# Patient Record
Sex: FEMALE | Race: OTHER | HISPANIC OR LATINO | URBAN - METROPOLITAN AREA
[De-identification: names, ages, dates, MRNs, and addresses within clinical notes are randomized per-mention and may not be internally consistent; named-entity substitution may affect disease eponyms.]

---

## 2022-01-11 ENCOUNTER — EMERGENCY (EMERGENCY)
Facility: HOSPITAL | Age: 62
LOS: 1 days | Discharge: HOME | End: 2022-01-11
Attending: EMERGENCY MEDICINE
Payer: COMMERCIAL

## 2022-01-11 VITALS
WEIGHT: 175.05 LBS | HEIGHT: 63 IN | SYSTOLIC BLOOD PRESSURE: 134 MMHG | OXYGEN SATURATION: 95 % | RESPIRATION RATE: 16 BRPM | TEMPERATURE: 98 F | HEART RATE: 78 BPM | DIASTOLIC BLOOD PRESSURE: 83 MMHG

## 2022-01-11 LAB
RAPID RVP RESULT: DETECTED
SARS-COV-2 RNA SPEC QL NAA+PROBE: DETECTED

## 2022-01-11 PROCEDURE — 99284 EMERGENCY DEPT VISIT MOD MDM: CPT

## 2022-01-11 PROCEDURE — 0225U NFCT DS DNA&RNA 21 SARSCOV2: CPT

## 2022-01-11 PROCEDURE — 99283 EMERGENCY DEPT VISIT LOW MDM: CPT

## 2022-01-11 RX ORDER — ACETAMINOPHEN 500 MG
650 TABLET ORAL ONCE
Refills: 0 | Status: DISCONTINUED | OUTPATIENT
Start: 2022-01-11 | End: 2022-01-14

## 2022-01-11 NOTE — ED ADULT NURSE NOTE - OBJECTIVE STATEMENT
62 y/o female presenting to ED ambulatory through  for cough/ phlegm/ headache x 4 days. pt states "my  is admitted for covid and I know i'm younger than him but I want to make sure i'm ok." Pt endorses "light headache." Upon exam pt A&Ox3, pt is well appearing, ambulates independently, gross neuro intact,  no difficulty speaking in complete sentences, s1s2 heart sounds heard, pulses x 4, woo x4, abdomen soft nontender nondistended, skin intact. pt denies chest pain, sob,  n/v/d, abdominal pain, f/c, urinary symptoms, hematuria. Pt did not get covid swabbed outpatient.

## 2022-01-11 NOTE — ED PROVIDER NOTE - OBJECTIVE STATEMENT
pt comes to the ER for worsening non productive cough, body aches, and headache, denies fevers, chills, abdo pain, N/V/D,

## 2022-01-11 NOTE — ED PROVIDER NOTE - CLINICAL SUMMARY MEDICAL DECISION MAKING FREE TEXT BOX
Dr. Rosado Note: dizziness likely related to mild dehydration in setting of viral uri symptoms, education, po fluids, sxs relief.

## 2022-01-11 NOTE — ED PROVIDER NOTE - PATIENT PORTAL LINK FT
You can access the FollowMyHealth Patient Portal offered by [facility] by registering at the following website: [email or web address]. By joining "[employer]’s FollowMyHealth portal, you will also be able to view your health information using other applications (apps) compatible with our system.

## 2022-01-11 NOTE — ED PROVIDER NOTE - NSFOLLOWUPINSTRUCTIONS_ED_ALL_ED_FT
Log Out.      [employer]® CareNotes®     :  [facility]  	                       COVID-19 (CORONAVIRUS DISEASE 2019) - General Information           COVID-19 (Enfermedad por coronavirus 2019)    LO QUE NECESITA SABER:    ¿Qué necesito saber acerca de la COVID-19?La COVID-19 es la enfermedad causada por el nuevo coronavirus descubierto por primera vez en diciembre de 2019. Los coronavirus generalmente causan infecciones de las vías respiratorias superiores (nariz, garganta y pulmones), ria un resfriado. El virus de 2019 se propaga rápida y fácilmente. Puede transmitirse a partir de 2 a 3 días antes de que comiencen los síntomas.    ¿Qué necesito saber acerca de las variantes?El virus ha cambiado en varias formas nuevas (llamadas variantes) desde que se descubrió. Las variantes pueden ser más contagiosas (se propagan fácilmente) que la forma original. Además, algunas pueden causar gallito enfermedad más grave que otras.    ¿Cuáles son los signos y síntomas de la COVID-19?Es posible que no presente ningún signo o síntoma. Los signos y síntomas suelen empezar unos 5 días después de la infección richard pueden tardar de 2 a 14 días. Puede sentir ria si tuviera gripe o un resfriado iris. Algunos signos y síntomas desaparecen en unos pocos días. Otros pueden durar semanas, meses o posiblemente años. Puede presentar cualquiera de los siguientes signos o síntomas:   •Tos      •Falta de aliento o dificultad para respirar que puede llegar a ser grave      •Fiebre      •Escalofríos que pueden incluir temblores      •Dolor muscular, robin corporales o dolor de kelsea      •El dolor de garganta      •Cambios repentinos o pérdida del gusto o el olfato      •Sensación de cansancio físico y mental (fatiga)      •Congestión (de la nariz y la kelsea) o flujo nasal      •Diarrea, náuseas o vómitos      ¿Cómo se diagnostica la COVID-19?Las pruebas se ofrecen en muchos sitios. Es posible que deba permanecer en cuarentena hasta que tenga los resultados. Es posible que se use cualquiera de lo siguiente:   •Gallito prueba PCR viralmuestra si tiene gallito infección actualmente. Se albert gallito muestra de la nariz o la garganta con un hisopo. Es posible que tenga que esperar 1 día o más para obtener los resultados de la prueba.      •Gallito prueba de antígenosmuestra si tiene gallito proteína del virus de la COVID-19. Esta prueba suele denominarse prueba rápida porque los resultados pueden estar disponibles en 30 minutos o menos.      •Gallito prueba de anticuerposmuestra si tuvo gallito infección reciente o en el pasado. Para esta prueba se utilizan muestras de karol. Los anticuerpos son producidos por el sistema inmunitario para atacar el virus que causa la COVID-19. Los anticuerpos se luiz de 1 a 3 semanas después de que se contagie. Esta prueba no se utiliza para demostrar si se es inmune al virus.      •Gallito TAC, gallito RMN, gallito ecografía o gallito radiografíapodrían realizarse para comprobar si tiene existen complicaciones de la COVID-19. Por ejemplo, neumonía, coágulos de karol u otras complicaciones.      ¿Cómo se trata la COVID-19?  •Los síntomas levespodrían mejorar por sí solos. Algunos tratamientos cuentan con gallito autorización de uso de emergencia (EUA). Por ejemplo, los anticuerpos monoclonales y el plasma de convaleciente. Es posible que se administren para ayudar a prevenir el empeoramiento de los síntomas. Es posible que también necesite alguno de los siguientes tratamientos:?Los descongestivosayudan a reducir la congestión nasal y ayudan a respirar más fácilmente. Si albert pastillas descongestivas, pueden causarle agitación o problemas para dormir. No use aerosol descongestionante por más de unos cuantos días.      ?Los jarabes para la tosayudan a reducir la tos. Pregúntele a serrano médico cuál tipo de medicamento para la tos es mejor para usted.      ?Para aliviar el dolor de garganta,cheryl gárgaras con agua salada tibia, o use pastillas para la garganta o un aerosol para la garganta. Nicky más líquidos para disolver y aflojar la mucosidad y para prevenir la deshidratación.      ?Los LU o el acetaminofenopueden ayudar a bajar la fiebre y aliviar los robin corporales o el dolor de kelsea. Siga las indicaciones. Si no se graham correctamente, los LU pueden causar sangrado estomacal o daño renal y el acetaminofeno puede dañar hepático.      •Los síntomas severos o potencialmente mortalesse tratan en el hospital. Es posible que usted necesite alguno de los siguientes: ?Los medicamentospodrían administrarse para combatir el virus o tratar gallito inflamación.      ?Los anticoagulantesayudan a prevenir o tratar los coágulos de karol. Si tiene trombosis venosa profunda (TVP) o embolia pulmonar (EP), gaby vez necesite seguir usando anticoagulantes alejandra al menos 3 meses.      ?El oxígeno adicionalpodría administrarse si tiene insuficiencia respiratoria. [place] significa que los pulmones no pueden llevar suficiente oxígeno a la karol y a los órganos.      ?Un respiradorpodría usarse para ayudarlo a respirar.        ¿Qué tino saber sobre los problemas de kayla que puede causar el virus?Puede desarrollar problemas de kayla a della plazo causados por el virus. El riesgo es mayor si usted tiene 65 años o más. Un sistema inmunitario débil, la obesidad, diabetes o enfermedad cardíaca o pulmonar también pueden aumentar el riesgo. El riesgo también es mayor si usted es o ha sido fumador de cigarrillos. La COVID-19 puede danae lugar a cualquiera de las siguientes situaciones:  •Síndrome multisistémico inflamatorio en adultos (MIS-A) o en niños (MIS-C), que causa inflamación en el corazón, el sistema digestivo, la piel o el cerebro      •Falta de aliento, afecciones respiratorias inferiores graves, ria neumonía o síndrome de dificultad respiratoria aguda (SDRA).      •Coágulos de karol o daños en los vasos sanguíneos      •Daños en los órganos por falta de oxígeno o por coágulos de karol      •Problemas para dormir      •Problemas para pensar claramente, para recordar información o para concentrarse      •Cambios en el estado de ánimo, depresión o ansiedad      •Problemas con el gusto o el olfato a della plazo      •Pérdida del apetito y de peso      •Dolor en los nervios      •Fatiga (sensación de cansancio físico y mental)      ¿Cómo se propaga el coronavirus 2019?  •Las gotitas son la principal forma de propagación de todos los coronavirus.El virus viaja en las gotitas que se luiz cuando gallito persona habla, canta, tose o estornuda. Las gotitas también pueden flotar en el aire por minutos u horas. La infección se produce cuando respira las gotitas o cuando le tocan los ojos o la nariz. El contacto personal cercano con gallito persona infectada aumenta el riesgo de infección. [place] significa estar a menos de 6 pies (2 metros) de distancia de la persona laejandra al menos 15 minutos en 24 horas.      •El contacto de persona a persona puede propagar el virus.Por ejemplo, gallito persona con el virus en jyoti alhaji puede propagarlo al darle la mano a alguien.      •El virus puede permanecer en objetos y superficies hasta 3 días.Puede infectarse si toca el objeto o la superficie y luego se toca los ojos o la boca.      ¿Qué necesito saber acerca de las vacunas contra la COVID-19?Los médicos recomiendan la vacuna contra la COVID-19, incluso si ya tuvo COVID-19. Se considera que usted está completamente vacunado contra la COVID-19 dos semanas después de la última dosis de cualquier vacuna contra COVID-19. Informe a serrano médico cuando haya recibido la última dosis de la vacuna. Cheryl gallito copia de serrano tarjeta de vacunación. Conserve el original en andrew de que tenga que mostrarlo. Mantenga la copia en un lugar seguro.  •Las vacunas contra la COVID-19 se administran en forma de inyección en 1 o 2 dosis.  Se recomienda la vacunación a todas las personas mayores de 5 años. Gallito vacuna de 2 dosis está plenamente aprobada para los mayores de 16 años. Esta vacuna también cuenta con gallito autorización para uso de emergencia (EUA) para niños de 5 a 15 años. Actualmente no se dispone de gallito vacuna para los niños menores de 5 años. También se recomienda gallito dosis adicional (de refuerzo) para todos los adultos mayores de 18 años. La dosis de refuerzo puede ser gallito jennifer diferente de la vacuna contra la COVID-19 que recibió originalmente. El momento de la dosis de refuerzo depende del tipo de vacuna que haya recibido:?Vacuna de 1 dosis:La dosis de refuerzo se administra al menos 2 meses después de steven recibido la vacuna.      ?Vacuna de 2 dosis:La dosis de refuerzo se administra al menos 6 meses después de la segunda dosis.    Calendario de vacunación contra la COVID-19         •Continúe con el distanciamiento social y otras medidas, incluso después de recibir la vacuna.Aunque no es frecuente, puede contagiarse después de recibir la vacuna. También puede transmitir el virus a otras personas sin saber que tiene la infección.      •Después de vacunarse, compruebe las normas de viaje locales, nacionales e internacionales.Es posible que tenga que hacerse la prueba antes de viajar. Algunos países exigen evidencia de que la prueba es negativa antes de viajar. Gaby vez también sea necesario hacer cuarentena tras serrano regreso.      ¿Cómo puede ayudar a reducir el riesgo de COVID-19?  •Lávese las alhaji con frecuencia alejandra el día.Utilice agua y jabón. Frótese las alhaji enjabonadas, entrelazando los dedos, alejandra al menos 20 segundos. Enjuáguese con agua corriente caliente. Séquese las alhaji con gallito toalla limpia o gallito toalla de papel. Puede usar un desinfectante para alhaji que contenga alcohol, si no hay agua y jabón disponibles. Enseñe a los niños a lavarse las alhaji y a usar el desinfectante de alhaji.  Lavado de alhaji           •Cúbrase al toser y estornudar.Gire la urvashi y cúbrase la boca y la nariz con un pañuelo. Deseche el pañuelo. Use el ángulo del brazo si no tiene un pañuelo disponible. Luego lávese las alhaji con agua y jabón o use un desinfectante de alhaji. Enséñeles a los niños a cubrirse al toser o estornudar.      •Use un tapabocas (máscara) cuando sea necesario.Utilice un tapabocas de gisselle con al menos 2 capas. También puede crear capas colocando un tapabocas de gisselle sobre gallito máscara no médica desechable. Cúbrase la boca y la nariz.  Cómo usar un tapabocas (mascarilla)           •Siga las pautas de distanciamiento social a nivel local, nacional y mundial.Mantenga al menos 6 pies (2 metros) de distancia entre usted y los demás.      •Trate de no tocarse la urvashi.Si tiene el virus en las alhaji, puede transferirlo a los ojos, la nariz o la boca e infectarse. También puede transferirlo a los objetos, las superficies o las personas.      •Limpie y desinfecte a menudo los objetos y las superficies de alto contacto.Use toallitas húmedas desinfectantes o cheryl gallito solución de 4 cucharaditas de lejía en 1 cuWaggonero de [facility] (4 tazas) de agua.      •Pregunte sobre otras vacunas que usted puede necesitar.Debe recibir la vacuna contra la influenza (gripe) tan pronto ria se lo recomienden cada año, generalmente en septiembre u octubre. Vacúnese contra la neumonía si se recomienda. Serrano médico puede indicarle si también debe recibir otras vacunas, y cuándo aplicárselas.    Prevenga la infección por COVID-19         ¿Cómo sigo las pautas de distanciamiento social para ayudar a reducir el riesgo de COVID-19?Las normas de distanciamiento social nacionales y locales varían. Las reglas y restricciones pueden cambiar con el tiempo a medida que se levantan las restricciones. Las siguientes son reglas generales al respecto:   •Quédese en casa si está enfermo o bailey que puede tener COVID-19.Es importante que se quede en casa si está esperando gallito don para gallito prueba o los resultados de gallito prueba.      •Evite el contacto físico cercano con nadie que no viva en serrano casa.No le dé la mano, abrace o bese a gallito persona ria saludo. Si tiene que usar el transporte público (ria el autobús o el metro), intente sentarse o pararse lejos de los demás. Use el tapabocas.      •Evite las reuniones en persona y las multitudes.Asista virtualmente, si es posible.      ¿Dónde puedo obtener más información?  •Centers for Disease Control and Prevention  [address] [place] [address]  Phone: 1-633.717.9832  Web Address: http://www.cdc.gov        Llame al número de emergencias local (911 en los [place]) si:  •Usted tiene dificultad para respirar o falta de aliento mientras descansa.      •Usted siente presión o dolor en el pecho que dura más de 5 minutos.      •Usted tiene confusión o es difícil despertarlo.      •Jyoti labios o urvashi están azules.      ¿Cuándo tino buscar atención inmediata?  •Usted tiene fiebre de 104 °F (40 °C) o más.          ¿Cuándo tino llamar a mi médico?  •Usted tiene síntomas de COVID-19.      •Usted tiene preguntas o inquietudes acerca de serrano condición o cuidado.      ACUERDOS SOBRE SERRANO CUIDADO:    Usted tiene el derecho de ayudar a planear serrano cuidado. Aprenda todo lo que pueda sobre serrano condición y ria darle tratamiento. Discuta jyoti opciones de tratamiento con jyoti médicos para decidir el cuidado que usted desea recibir. Usted siempre tiene el derecho de rechazar el tratamiento.       © Copyright [employer] 2022           back to top                          © Copyright [employer] 2022

## 2022-01-11 NOTE — ED PROVIDER NOTE - CARE PLAN
1 Principal Discharge DX:	Viral URI with cough   Principal Discharge DX:	Viral URI with cough  Secondary Diagnosis:	Mild dehydration

## 2022-01-11 NOTE — ED PROVIDER NOTE - ATTENDING CONTRIBUTION TO CARE
Pt with cough, congestion, feeling lightheaded when standing, headache.  Neuro intact, MMM, clear lungs, RRR.  Educated pt on fluid intake, tylenol and motrin use.  Pt's  in ED with covid.  Pt presumed to have same.  Told to stay at home.
